# Patient Record
Sex: FEMALE | Race: BLACK OR AFRICAN AMERICAN | NOT HISPANIC OR LATINO
[De-identification: names, ages, dates, MRNs, and addresses within clinical notes are randomized per-mention and may not be internally consistent; named-entity substitution may affect disease eponyms.]

---

## 2018-03-23 ENCOUNTER — RESULT REVIEW (OUTPATIENT)
Age: 73
End: 2018-03-23

## 2018-07-23 PROBLEM — Z00.00 ENCOUNTER FOR PREVENTIVE HEALTH EXAMINATION: Status: ACTIVE | Noted: 2018-07-23

## 2018-08-23 ENCOUNTER — APPOINTMENT (OUTPATIENT)
Dept: UROLOGY | Facility: CLINIC | Age: 73
End: 2018-08-23
Payer: MEDICARE

## 2018-08-23 VITALS
DIASTOLIC BLOOD PRESSURE: 66 MMHG | BODY MASS INDEX: 40.44 KG/M2 | WEIGHT: 206 LBS | HEART RATE: 65 BPM | SYSTOLIC BLOOD PRESSURE: 117 MMHG | HEIGHT: 60 IN

## 2018-08-23 DIAGNOSIS — D57.3 SICKLE-CELL TRAIT: ICD-10-CM

## 2018-08-23 DIAGNOSIS — Z82.49 FAMILY HISTORY OF ISCHEMIC HEART DISEASE AND OTHER DISEASES OF THE CIRCULATORY SYSTEM: ICD-10-CM

## 2018-08-23 DIAGNOSIS — I25.10 ATHEROSCLEROTIC HEART DISEASE OF NATIVE CORONARY ARTERY W/OUT ANGINA PECTORIS: ICD-10-CM

## 2018-08-23 DIAGNOSIS — R60.9 EDEMA, UNSPECIFIED: ICD-10-CM

## 2018-08-23 DIAGNOSIS — E66.01 MORBID (SEVERE) OBESITY DUE TO EXCESS CALORIES: ICD-10-CM

## 2018-08-23 DIAGNOSIS — Z83.2 FAMILY HISTORY OF DISEASES OF THE BLOOD AND BLOOD-FORMING ORGANS AND CERTAIN DISORDERS INVOLVING THE IMMUNE MECHANISM: ICD-10-CM

## 2018-08-23 DIAGNOSIS — Z78.9 OTHER SPECIFIED HEALTH STATUS: ICD-10-CM

## 2018-08-23 DIAGNOSIS — Z83.3 FAMILY HISTORY OF DIABETES MELLITUS: ICD-10-CM

## 2018-08-23 DIAGNOSIS — I10 ESSENTIAL (PRIMARY) HYPERTENSION: ICD-10-CM

## 2018-08-23 DIAGNOSIS — E11.9 TYPE 2 DIABETES MELLITUS W/OUT COMPLICATIONS: ICD-10-CM

## 2018-08-23 PROCEDURE — 99203 OFFICE O/P NEW LOW 30 MIN: CPT

## 2018-08-23 RX ORDER — ISOSORBIDE DINITRATE 30 MG/1
30 TABLET ORAL
Refills: 0 | Status: ACTIVE | COMMUNITY

## 2018-08-23 RX ORDER — VALSARTAN AND HYDROCHLOROTHIAZIDE 320; 12.5 MG/1; MG/1
320-12.5 TABLET, FILM COATED ORAL
Refills: 0 | Status: ACTIVE | COMMUNITY

## 2018-08-23 RX ORDER — AMLODIPINE BESYLATE 10 MG/1
10 TABLET ORAL
Refills: 0 | Status: ACTIVE | COMMUNITY

## 2018-08-23 RX ORDER — SITAGLIPTIN 100 MG/1
100 TABLET, FILM COATED ORAL
Refills: 0 | Status: ACTIVE | COMMUNITY

## 2018-08-23 RX ORDER — ATORVASTATIN CALCIUM 20 MG/1
20 TABLET, FILM COATED ORAL
Refills: 0 | Status: ACTIVE | COMMUNITY

## 2018-08-23 RX ORDER — ASPIRIN 81 MG
81 TABLET, DELAYED RELEASE (ENTERIC COATED) ORAL
Refills: 0 | Status: ACTIVE | COMMUNITY

## 2018-08-23 RX ORDER — GLIPIZIDE 10 MG/1
10 TABLET ORAL
Refills: 0 | Status: ACTIVE | COMMUNITY

## 2018-08-23 RX ORDER — METOPROLOL TARTRATE 50 MG/1
50 TABLET, FILM COATED ORAL
Refills: 0 | Status: ACTIVE | COMMUNITY

## 2018-08-23 RX ORDER — KETOROLAC TROMETHAMINE 4 MG/ML
0.4 SOLUTION/ DROPS OPHTHALMIC
Refills: 0 | Status: ACTIVE | COMMUNITY

## 2018-08-23 NOTE — REVIEW OF SYSTEMS
[Eyesight Problems] : eyesight problems [Lower Ext Edema] : lower extremity edema [see HPI] : see HPI [Arthralgias] : arthralgias [Negative] : Heme/Lymph

## 2019-08-05 ENCOUNTER — APPOINTMENT (OUTPATIENT)
Dept: UROLOGY | Facility: CLINIC | Age: 74
End: 2019-08-05
Payer: MEDICARE

## 2019-08-05 ENCOUNTER — OUTPATIENT (OUTPATIENT)
Dept: OUTPATIENT SERVICES | Facility: HOSPITAL | Age: 74
LOS: 1 days | Discharge: HOME | End: 2019-08-05

## 2019-08-05 VITALS
SYSTOLIC BLOOD PRESSURE: 116 MMHG | DIASTOLIC BLOOD PRESSURE: 66 MMHG | HEART RATE: 65 BPM | HEIGHT: 60 IN | WEIGHT: 206 LBS | BODY MASS INDEX: 40.44 KG/M2

## 2019-08-05 PROCEDURE — 99213 OFFICE O/P EST LOW 20 MIN: CPT

## 2019-08-05 NOTE — LETTER BODY
[Dear  ___] : Dear  [unfilled], [Courtesy Letter:] : I had the pleasure of seeing your patient, [unfilled], in my office today. [Please see my note below.] : Please see my note below. [Sincerely,] : Sincerely, [FreeTextEntry2] : Marli Emmanuel MD\par [address]\[place] [address]\par

## 2019-08-05 NOTE — ASSESSMENT
[FreeTextEntry1] : Her physical exam other than her weight showed significant peripheral edema and it is only 1030 in the morning. We discussed why edema which causes what I call “cardiac nocturia” can be the cause of her nighttime voiding and there are behavior modifications that she can do to improve but probably not illuminate that. Number one shall speak to her doctor and see if her diuretics can be switched to the late afternoon or early evening to try and deplete her intravascular volume. When I worried about orthostasis as she’s going to bed and if we deplete her intravascular volume than the fluid that gets absorbed would go into a partially empty space with less of it going to her kidneys.\par \par A low-tech way is just to raise her feet in the late evening early night. This could be while she is reading a book or watching TV. Any fluid she mobilizes while awake is less fluid she will mobilize all asleep therefore she will have to wake up less often.\par \par The third option is to cut back on evening fluid. However if she like a glass of wine or a cup of tea at bedtime and doesn’t mind waking up at night it doesn’t bother me.\par \par With respect to her renal cyst, the renal ultrasound report was not explicit as it describes the mid pole cyst to be possibly new but reportedly she is comparing it to a previous study. If it was seen it is not new. If it was not seen on the previous study then it is not “may be new” it is new. Additionally the mid pole cyst which it says was 21 mm on the previous study and is now 12 x 10 by 10 is difficult to understand as the cysto get smaller. This may all just be technical or technician variability. Regardless benign cyst especially with all her other comorbidities are of no major clinical significance. One of them looks to me like it is partially parapelvic though is difficult to tell from the disc that I got. If it is that needs to be watched as that can sometimes cause obstruction. The most I would do with the cyst is just repeat the ultrasound in six months. We would see her then but if there is no major change that I think she can just follow up with her PCP.\par \par With respect to her infection as women get older and the vaginal epithelium atrophy secondary to the lack of estrogen the pH becomes less acidic. This gives bacteria the opportunity to live within the Vagina. When that happens especially if her sugar is under poor control, as we can see from her hemoglobin A1C being almost 8, that adds to the bacterial colonization of the Vagina. From the Vagina to the urethra is usually less than a centimeter. A low-tech low-cost way is vinegar douching. She can buy an old-fashioned hot water bottle with the douching adapter. One part vinegar to 10 parts warm tap water and irrigate out the Vagina anywhere between three times per week to nightly. If that works all well and good and it may help the irritated feeling she sometimes gets in the vaginal area. If she keeps getting infections will have to look into it further. I would recommend you try this for a few weeks and then get a urinalysis and culture by her PCP and if there’s a problem call me back.\par

## 2019-08-05 NOTE — HISTORY OF PRESENT ILLNESS
[FreeTextEntry1] : Bren is a 74-year-old -American female seen in August 2018. We recommended serial ultrasounds for her renal cyst, douching for the occasional UTI and lower urinary tract symptoms, paper modification cutting back on PO fluid to help decrease nocturia. She comes back now about six months since I had wanted, essentially because urologically she’s doing well. She did have one recent episode of severe right-sided pain which they initially thought was a stone but then later they decided it was a phlebolith and that her symptoms were cardiac related. [Nocturia] : nocturia [None] : None

## 2019-08-05 NOTE — LETTER HEADER
[FreeTextEntry3] : Angi Harper M.D.\par Director of Urology\par [facility]\par [address] [place] [address], [address]\[place] [address]

## 2019-08-05 NOTE — ASSESSMENT
[FreeTextEntry1] : If she cuts back on the evening fluid she wakes up one towards early morning and that’s livable. She’s doing the douching and is not having the symptoms. She has not had a follow-up sono but is scheduled.\par Physical exam really shows no major change. She has some mild pedal edema and she’s concerned re-her potential cardiac issue that was to the pain that put her in the emergency room. From urologic aspect we are not changing anything. She will check her urine periodically and that is a problem come in sooner otherwise we’ll see her after the renal sono in December\par

## 2019-08-05 NOTE — PHYSICAL EXAM
[General Appearance - Well Nourished] : well nourished [General Appearance - Well Developed] : well developed [Normal Appearance] : normal appearance [Well Groomed] : well groomed [General Appearance - In No Acute Distress] : no acute distress [FreeTextEntry1] : obese but has lost weight [Oriented To Time, Place, And Person] : oriented to person, place, and time [Affect] : the affect was normal [Mood] : the mood was normal [Not Anxious] : not anxious [Normal Station and Gait] : the gait and station were normal for the patient's age

## 2019-08-05 NOTE — LETTER HEADER
[FreeTextEntry3] : Angi Harper M.D.\par Director of Urology\par [facility]\par [address] [place] [address], [address]\[place] [address]

## 2019-08-05 NOTE — PHYSICAL EXAM
[General Appearance - Well Developed] : well developed [General Appearance - Well Nourished] : well nourished [Normal Appearance] : normal appearance [Well Groomed] : well groomed [General Appearance - In No Acute Distress] : no acute distress [Heart Rate And Rhythm] : Heart rate and rhythm were normal [Respiration, Rhythm And Depth] : normal respiratory rhythm and effort [Exaggerated Use Of Accessory Muscles For Inspiration] : no accessory muscle use [Auscultation Breath Sounds / Voice Sounds] : lungs were clear to auscultation bilaterally [Abdomen Soft] : soft [Abdomen Tenderness] : non-tender [Costovertebral Angle Tenderness] : no ~M costovertebral angle tenderness [Normal Station and Gait] : the gait and station were normal for the patient's age [] : no rash [No Focal Deficits] : no focal deficits [Oriented To Time, Place, And Person] : oriented to person, place, and time [Affect] : the affect was normal [Mood] : the mood was normal [Not Anxious] : not anxious [FreeTextEntry1] : DTR= nml

## 2019-08-05 NOTE — LETTER BODY
[Dear  ___] : Dear  [unfilled], [Consult Letter:] : I had the pleasure of evaluating your patient, [unfilled]. [Please see my note below.] : Please see my note below. [Consult Closing:] : Thank you very much for allowing me to participate in the care of this patient.  If you have any questions, please do not hesitate to contact me. [FreeTextEntry2] : Marli Emmanuel MD\par [address]\[place] [address]\par

## 2019-08-05 NOTE — HISTORY OF PRESENT ILLNESS
[Nocturia] : nocturia [None] : None [FreeTextEntry1] : Ms. Mann is a very pleasant 73 or female of -American descent who on an ultrasound done as part of maintenance of her health on July 13, 2018 showed to right renal cysts. One was recorded as smaller one which recorded as new.\par \par A separate issue is nocturia times two or three. 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 When the sugar is under better control she does better but lately she’s been on steroids because of peripheral distant disease and her sugars have not been a stables in the past.\par \par Finally she developed a UTI in mid July, something she has not had in quite a while and she wonders what we can do about that.\par

## 2019-08-06 DIAGNOSIS — R35.1 NOCTURIA: ICD-10-CM

## 2019-08-06 LAB
APPEARANCE: CLEAR
BILIRUBIN URINE: NEGATIVE
BLOOD URINE: NEGATIVE
COLOR: YELLOW
GLUCOSE QUALITATIVE U: NEGATIVE
KETONES URINE: NEGATIVE
LEUKOCYTE ESTERASE URINE: NEGATIVE
NITRITE URINE: NEGATIVE
PH URINE: 5.5
PROTEIN URINE: NEGATIVE
SPECIFIC GRAVITY URINE: 1.02
UROBILINOGEN URINE: NORMAL

## 2019-08-07 LAB — URINE CYTOLOGY: NORMAL

## 2019-08-08 LAB — BACTERIA UR CULT: ABNORMAL

## 2019-08-09 ENCOUNTER — OUTPATIENT (OUTPATIENT)
Dept: OUTPATIENT SERVICES | Facility: HOSPITAL | Age: 74
LOS: 1 days | Discharge: HOME | End: 2019-08-09

## 2019-08-10 DIAGNOSIS — R35.1 NOCTURIA: ICD-10-CM

## 2019-08-12 ENCOUNTER — RESULT REVIEW (OUTPATIENT)
Age: 74
End: 2019-08-12

## 2019-08-12 LAB
APPEARANCE: CLEAR
BACTERIA UR CULT: ABNORMAL
BILIRUBIN URINE: NEGATIVE
BLOOD URINE: NEGATIVE
COLOR: YELLOW
GLUCOSE QUALITATIVE U: NEGATIVE
KETONES URINE: NEGATIVE
LEUKOCYTE ESTERASE URINE: NEGATIVE
NITRITE URINE: NEGATIVE
PH URINE: 6
PROTEIN URINE: NORMAL
SPECIFIC GRAVITY URINE: 1.02
UROBILINOGEN URINE: NORMAL

## 2019-08-16 ENCOUNTER — OUTPATIENT (OUTPATIENT)
Dept: OUTPATIENT SERVICES | Facility: HOSPITAL | Age: 74
LOS: 1 days | Discharge: HOME | End: 2019-08-16

## 2019-08-16 ENCOUNTER — APPOINTMENT (OUTPATIENT)
Dept: UROLOGY | Facility: CLINIC | Age: 74
End: 2019-08-16
Payer: MEDICARE

## 2019-08-16 VITALS
DIASTOLIC BLOOD PRESSURE: 70 MMHG | HEART RATE: 71 BPM | BODY MASS INDEX: 40.44 KG/M2 | WEIGHT: 206 LBS | SYSTOLIC BLOOD PRESSURE: 132 MMHG | HEIGHT: 60 IN

## 2019-08-16 DIAGNOSIS — N39.0 URINARY TRACT INFECTION, SITE NOT SPECIFIED: ICD-10-CM

## 2019-08-16 DIAGNOSIS — A49.9 URINARY TRACT INFECTION, SITE NOT SPECIFIED: ICD-10-CM

## 2019-08-16 PROCEDURE — 51701 INSERT BLADDER CATHETER: CPT

## 2019-08-17 DIAGNOSIS — N39.0 URINARY TRACT INFECTION, SITE NOT SPECIFIED: ICD-10-CM

## 2019-08-19 LAB — BACTERIA UR CULT: NORMAL

## 2019-12-01 ENCOUNTER — FORM ENCOUNTER (OUTPATIENT)
Age: 74
End: 2019-12-01

## 2019-12-02 ENCOUNTER — OUTPATIENT (OUTPATIENT)
Dept: OUTPATIENT SERVICES | Facility: HOSPITAL | Age: 74
LOS: 1 days | Discharge: HOME | End: 2019-12-02
Payer: MEDICARE

## 2019-12-02 DIAGNOSIS — R35.1 NOCTURIA: ICD-10-CM

## 2019-12-02 PROCEDURE — 76775 US EXAM ABDO BACK WALL LIM: CPT | Mod: 26

## 2019-12-12 ENCOUNTER — APPOINTMENT (OUTPATIENT)
Dept: UROLOGY | Facility: CLINIC | Age: 74
End: 2019-12-12
Payer: MEDICARE

## 2019-12-12 VITALS
HEART RATE: 63 BPM | SYSTOLIC BLOOD PRESSURE: 142 MMHG | BODY MASS INDEX: 40.44 KG/M2 | WEIGHT: 206 LBS | HEIGHT: 60 IN | DIASTOLIC BLOOD PRESSURE: 75 MMHG

## 2019-12-12 PROCEDURE — 99213 OFFICE O/P EST LOW 20 MIN: CPT

## 2019-12-12 RX ORDER — METHYLPREDNISOLONE 4 MG/1
4 TABLET ORAL
Refills: 0 | Status: COMPLETED | COMMUNITY
End: 2019-12-12

## 2019-12-12 NOTE — HISTORY OF PRESENT ILLNESS
[FreeTextEntry1] : Bren is a very pleasant 74-year-old -American female last seen on August 2019. At that point catheterize urine said showed that her positive cultures were more non-clean catch urines. Voiding evaluation showed that her nocturia was more related to volume of intake and we recommended behavior modification, serial urine’s and if you became symptomatic to come in and give us a call. She comes in today having not done the urinary testing but she’s been asymptomatic, she had the ultrasound done December 2 is here for review [None] : no symptoms

## 2019-12-12 NOTE — LETTER BODY
[Dear  ___] : Dear  [unfilled], [Courtesy Letter:] : I had the pleasure of seeing your patient, [unfilled], in my office today. [Please see my note below.] : Please see my note below. [Sincerely,] : Sincerely, [FreeTextEntry2] : Marli Emmanuel MD\par [address]\[place] [address]\par

## 2019-12-12 NOTE — ASSESSMENT
[FreeTextEntry1] : She is asymptomatic. Even if she had bacteria in her urine we would not treat her. I think because there is a calcification in the system or we should check up periodically but I think we can wait a year.\par \naty Correia I have agreed she gets symptomatic she will get a urine sample done and contact us if necessary will do a straight cath. Will get an ultrasound in a year God willing she will come back after that. If everything remain stable and I think at that point she can follow up with her PCP coming here p.r.n.\par

## 2019-12-12 NOTE — PHYSICAL EXAM
[General Appearance - Well Developed] : well developed [General Appearance - Well Nourished] : well nourished [Normal Appearance] : normal appearance [Well Groomed] : well groomed [General Appearance - In No Acute Distress] : no acute distress [] : no respiratory distress [FreeTextEntry1] : obese but has lost weight [Respiration, Rhythm And Depth] : normal respiratory rhythm and effort [Exaggerated Use Of Accessory Muscles For Inspiration] : no accessory muscle use [Oriented To Time, Place, And Person] : oriented to person, place, and time [Mood] : the mood was normal [Affect] : the affect was normal [Not Anxious] : not anxious [Normal Station and Gait] : the gait and station were normal for the patient's age

## 2019-12-12 NOTE — LETTER HEADER
[FreeTextEntry3] : Angi Harper M.D.\par Director of Urology\par [facility]\par [address] [place] [address], [address]\[place] [address]

## 2020-12-04 ENCOUNTER — RESULT REVIEW (OUTPATIENT)
Age: 75
End: 2020-12-04

## 2020-12-04 ENCOUNTER — OUTPATIENT (OUTPATIENT)
Dept: OUTPATIENT SERVICES | Facility: HOSPITAL | Age: 75
LOS: 1 days | Discharge: HOME | End: 2020-12-04
Payer: MEDICARE

## 2020-12-04 DIAGNOSIS — N28.1 CYST OF KIDNEY, ACQUIRED: ICD-10-CM

## 2020-12-04 PROCEDURE — 76775 US EXAM ABDO BACK WALL LIM: CPT | Mod: 26

## 2020-12-14 ENCOUNTER — APPOINTMENT (OUTPATIENT)
Dept: UROLOGY | Facility: CLINIC | Age: 75
End: 2020-12-14
Payer: MEDICARE

## 2020-12-14 VITALS
HEIGHT: 59 IN | HEART RATE: 61 BPM | WEIGHT: 203 LBS | DIASTOLIC BLOOD PRESSURE: 63 MMHG | TEMPERATURE: 97 F | SYSTOLIC BLOOD PRESSURE: 128 MMHG | BODY MASS INDEX: 40.92 KG/M2

## 2020-12-14 DIAGNOSIS — R35.1 NOCTURIA: ICD-10-CM

## 2020-12-14 DIAGNOSIS — N28.1 CYST OF KIDNEY, ACQUIRED: ICD-10-CM

## 2020-12-14 PROCEDURE — 99072 ADDL SUPL MATRL&STAF TM PHE: CPT

## 2020-12-14 PROCEDURE — 99213 OFFICE O/P EST LOW 20 MIN: CPT

## 2020-12-14 NOTE — ASSESSMENT
[FreeTextEntry1] : Her renal cyst is stable from the past year. There is no need for intervention at this time. Our recommendation is that she obtains yearly sonograms by her PCP and if there is any change in her calcified cyst she should be sent to followup with us for further evaluation.\par \par As I explained to her I am not asking her PCP to manage the cyst if it changes but if there is no change for her just to come in and me to say you're doing well is not a good use of her time. If her PCP is not willing to do that there we will of course be happy to see her but I'd like to minimize the effort she expends in seeing physicians.\par \par Her nocturia appears to be volume related and doesn't bother her and if it doesn't bother her it doesn't bother me.

## 2020-12-14 NOTE — LETTER BODY
[Dear  ___] : Dear  [unfilled], [Courtesy Letter:] : I had the pleasure of seeing your patient, [unfilled], in my office today. [Please see my note below.] : Please see my note below. [Sincerely,] : Sincerely, [FreeTextEntry2] : Marli Emmanuel MD\par [address]\[place] [address]\par

## 2020-12-14 NOTE — PHYSICAL EXAM
[General Appearance - Well Developed] : well developed [General Appearance - Well Nourished] : well nourished [Normal Appearance] : normal appearance [Well Groomed] : well groomed [General Appearance - In No Acute Distress] : no acute distress [] : no respiratory distress [Respiration, Rhythm And Depth] : normal respiratory rhythm and effort [Exaggerated Use Of Accessory Muscles For Inspiration] : no accessory muscle use [Oriented To Time, Place, And Person] : oriented to person, place, and time [Affect] : the affect was normal [Mood] : the mood was normal [Not Anxious] : not anxious [FreeTextEntry1] : Ambulating with walker

## 2020-12-14 NOTE — HISTORY OF PRESENT ILLNESS
[Nocturia] : nocturia [FreeTextEntry1] : Bren is a 75-year-old female who we have been following for a "calcified" renal cyst. Has a history of nocturia, which has improved since she changed the timing of her blood pressure medications. We found her nocturia to be mostly volume based recommended behavioral changes. Currently she states she is voiding well denies any significantly bothersome voiding symptoms. She presents to review her renal sonogram.

## 2020-12-14 NOTE — LETTER HEADER
[FreeTextEntry3] : Angi Harper M.D.\par Director of Urology\par [facility]\par [address] [place] [address], [address]\[place] [address]

## 2020-12-21 PROBLEM — N39.0 BACTERIAL UTI: Status: RESOLVED | Noted: 2018-08-23 | Resolved: 2020-12-21

## 2022-08-16 ENCOUNTER — APPOINTMENT (OUTPATIENT)
Dept: ORTHOPEDIC SURGERY | Facility: CLINIC | Age: 77
End: 2022-08-16

## 2022-08-16 PROCEDURE — 20611 DRAIN/INJ JOINT/BURSA W/US: CPT | Mod: RT

## 2022-08-16 PROCEDURE — 73030 X-RAY EXAM OF SHOULDER: CPT | Mod: RT

## 2022-08-16 PROCEDURE — 99213 OFFICE O/P EST LOW 20 MIN: CPT | Mod: 25

## 2022-08-16 RX ORDER — METOPROLOL SUCCINATE 50 MG/1
50 TABLET, EXTENDED RELEASE ORAL
Qty: 90 | Refills: 0 | Status: ACTIVE | COMMUNITY
Start: 2022-08-11

## 2022-08-16 RX ORDER — ATORVASTATIN CALCIUM 40 MG/1
40 TABLET, FILM COATED ORAL
Qty: 90 | Refills: 0 | Status: ACTIVE | COMMUNITY
Start: 2022-05-19

## 2022-08-16 RX ORDER — NITROFURANTOIN (MONOHYDRATE/MACROCRYSTALS) 25; 75 MG/1; MG/1
100 CAPSULE ORAL
Qty: 14 | Refills: 0 | Status: ACTIVE | COMMUNITY
Start: 2022-03-28

## 2022-08-16 RX ORDER — IRBESARTAN AND HYDROCHLOROTHIAZIDE 150; 12.5 MG/1; MG/1
150-12.5 TABLET ORAL
Qty: 90 | Refills: 0 | Status: ACTIVE | COMMUNITY
Start: 2022-05-19

## 2022-08-16 RX ORDER — NABUMETONE 750 MG/1
750 TABLET, FILM COATED ORAL
Qty: 60 | Refills: 0 | Status: ACTIVE | COMMUNITY
Start: 2022-04-13

## 2022-08-16 RX ORDER — GLIPIZIDE 10 MG/1
10 TABLET, FILM COATED, EXTENDED RELEASE ORAL
Qty: 90 | Refills: 0 | Status: ACTIVE | COMMUNITY
Start: 2022-04-10

## 2022-08-16 NOTE — HISTORY OF PRESENT ILLNESS
[de-identified] : The patient is a 77-year-old female here for a re-evaluation of her left shoulder and would like her right shoulder evaluated.  Regarding her left shoulder she has osteoarthritis.  She tried nabumetone but it did not help her.  Regarding her right shoulder she has had 3 months of pain with no trauma to the area.  She is a diabetic.  Her blood glucose level this morning was 110.  She had cortisone injection for her left shoulder on 04/13/2022 which provided her with some relief.

## 2022-08-16 NOTE — DISCUSSION/SUMMARY
[de-identified] :   I reviewed the x-ray findings of the right shoulder with the patient.  She will start formal therapy for both shoulders.  Rx for meloxicam sent to the pharmacy for her.  Today we discussed a cortisone injection for the left shoulder and she agreed.  The left glenohumeral joint was injected with cortisone, procedure note generated.  She will monitor her blood glucose levels.  I will see her back in 2 months for further evaluation.\par \par Supervising physician:

## 2022-08-16 NOTE — PROCEDURE
[Large Joint Injection] : Large joint injection [Left] : of the left [Glenohumeral Joint] : glenohumeral joint [___ cc    1%] : Lidocaine ~Vcc of 1%  [___ cc    4mg] : Dexamethasone (Decadron) ~Vcc of 4 mg  [Glenohmeral injection] : glenohumeral injection [All ultrasound images have been permanently captured and stored accordingly in our picture archiving and communication system] : All ultrasound images have been permanently captured and stored accordingly in our picture archiving and communication system [Visualization of the needle and placement of injection was performed without complication] : visualization of the needle and placement of injection was performed without complication

## 2022-08-16 NOTE — DATA REVIEWED
[Right] : of the right [Shoulder] : shoulder [FreeTextEntry1] : Three views right shoulder were obtained today.  There is no fracture noted.  There is spurring off the greater tuberosity.  No glenohumeral joint arthritis.  Slightly high-riding humeral head.

## 2022-08-16 NOTE — PHYSICAL EXAM
[Bilateral] : shoulder bilaterally [] : motor and sensory intact distally [FreeTextEntry9] : Active forward flexion of the right shoulder to 90°, passive forward flexion of the right shoulder to 180°.  Active range of motion with abduction of the right shoulder to 90°, active internal rotation of the right shoulder to L3.\par \par Active forward flexion of the left shoulder to 90°, passive range of motion of the left shoulder with forward flexion 110°.  Active range of motion with abduction of the left shoulder to 90°, active range of motion with internal rotation of the left shoulder to L5. [de-identified] :   She has significant pain and weakness with rotator cuff resistance testing of the right shoulder.

## 2022-08-17 ENCOUNTER — APPOINTMENT (OUTPATIENT)
Dept: ORTHOPEDIC SURGERY | Facility: CLINIC | Age: 77
End: 2022-08-17

## 2022-09-13 ENCOUNTER — RX RENEWAL (OUTPATIENT)
Age: 77
End: 2022-09-13

## 2022-10-14 ENCOUNTER — RX RENEWAL (OUTPATIENT)
Age: 77
End: 2022-10-14

## 2022-10-18 ENCOUNTER — APPOINTMENT (OUTPATIENT)
Dept: ORTHOPEDIC SURGERY | Facility: CLINIC | Age: 77
End: 2022-10-18

## 2022-10-18 DIAGNOSIS — M19.012 PRIMARY OSTEOARTHRITIS, LEFT SHOULDER: ICD-10-CM

## 2022-10-18 PROCEDURE — 20611 DRAIN/INJ JOINT/BURSA W/US: CPT | Mod: LT

## 2022-10-18 PROCEDURE — 99213 OFFICE O/P EST LOW 20 MIN: CPT | Mod: 25

## 2022-10-18 NOTE — HISTORY OF PRESENT ILLNESS
[de-identified] : The patient is a 77-year-old female here for subsequent re-evaluation of both shoulders.  Regarding her right shoulder she is feeling much better.  She is doing home therapy exercises for the shoulder and she is taking meloxicam on a as needed basis.  Regarding the left shoulder, she had a cortisone injection on her previous visit here which provided her with very good relief.  It is starting to wear off.  She is a diabetic, her blood glucose level this morning was 84.  She recently had a breast biopsy and results were negative for a malignant process.

## 2022-10-18 NOTE — DISCUSSION/SUMMARY
[de-identified] : I recommended a repeat cortisone injection for the left shoulder, the patient agreed.  The left shoulder was injected with cortisone, procedure note generated.  She will continue doing home exercises for both shoulders.  She may take meloxicam on a as needed basis.  I will see her back in 3 months for further evaluation.\par \par Supervising physician:  Dr. Nuñez

## 2022-10-18 NOTE — PROCEDURE
[Large Joint Injection] : Large joint injection [Left] : of the left [Glenohumeral Joint] : glenohumeral joint [___ cc    1%] : Lidocaine ~Vcc of 1%  [___ cc    4mg] : Dexamethasone (Decadron) ~Vcc of 4 mg  [Risks, benefits, alternatives discussed / Verbal consent obtained] : the risks benefits, and alternatives have been discussed, and verbal consent was obtained [All ultrasound images have been permanently captured and stored accordingly in our picture archiving and communication system] : All ultrasound images have been permanently captured and stored accordingly in our picture archiving and communication system

## 2022-10-18 NOTE — PHYSICAL EXAM
[Bilateral] : shoulder bilaterally [] : no tenderness to palpation [FreeTextEntry9] : Active forward flexion right shoulder 160°, passive range of motion forward flexion 180°, active range of motion abduction 110°, active range of motion internal rotation L4.\par Active range of motion for flexion left shoulder 120°, passive range of motion for flexion 130° active abduction to 90°, active internal rotation L5. [de-identified] :   Some weakness rotator cuff resistance testing right shoulder, good strength with rotator cuff resistance testing left shoulder.

## 2023-01-18 ENCOUNTER — APPOINTMENT (OUTPATIENT)
Dept: ORTHOPEDIC SURGERY | Facility: CLINIC | Age: 78
End: 2023-01-18
Payer: MEDICARE

## 2023-01-18 DIAGNOSIS — M25.511 PAIN IN RIGHT SHOULDER: ICD-10-CM

## 2023-01-18 DIAGNOSIS — M75.02 ADHESIVE CAPSULITIS OF LEFT SHOULDER: ICD-10-CM

## 2023-01-18 PROCEDURE — 20610 DRAIN/INJ JOINT/BURSA W/O US: CPT | Mod: LT

## 2023-01-18 PROCEDURE — 99213 OFFICE O/P EST LOW 20 MIN: CPT | Mod: 25

## 2023-01-18 NOTE — PROCEDURE
[Large Joint Injection] : Large joint injection [Left] : of the left [Glenohumeral Joint] : glenohumeral joint [___ cc    1%] : Lidocaine ~Vcc of 1%  [___ cc    4mg] : Dexamethasone (Decadron) ~Vcc of 4 mg  [Risks, benefits, alternatives discussed / Verbal consent obtained] : the risks benefits, and alternatives have been discussed, and verbal consent was obtained

## 2023-01-18 NOTE — HISTORY OF PRESENT ILLNESS
[de-identified] : The patient is a 77-year-old female here for subsequent re-evaluation of both shoulders. She is doing home therapy exercises for both shoulders.  The left shoulder troubles more than the right shoulder.  She is a diabetic.  Her blood glucose this morning was 108.

## 2023-01-18 NOTE — PHYSICAL EXAM
[Bilateral] : shoulder bilaterally [] : no tenderness to palpation [FreeTextEntry9] : Active forward flexion right shoulder 170°, passive range of motion forward flexion 180°, active range of motion abduction 170°, active range of motion internal rotation L4.\par Active range of motion for flexion left shoulder 130°, passive range of motion for flexion 130° active abduction to 90°, active internal rotation L5. [de-identified] :   Good strength with rotator cuff resistance testing bilaterally.

## 2023-01-18 NOTE — DISCUSSION/SUMMARY
[de-identified] :   Today I recommend a cortisone injection for the left shoulder.  The patient agreed.  The left glenohumeral joint was injected with 2 cc lidocaine, 1 cc dexamethasone.  Procedure note generated.  She will continue home therapy exercises for both shoulders.  I will see her back in 2 months for further evaluation.\par \par Supervising physician:  Dr. Pickens

## 2023-03-20 ENCOUNTER — APPOINTMENT (OUTPATIENT)
Dept: ORTHOPEDIC SURGERY | Facility: CLINIC | Age: 78
End: 2023-03-20

## 2023-09-29 ENCOUNTER — APPOINTMENT (OUTPATIENT)
Dept: NEUROLOGY | Facility: CLINIC | Age: 78
End: 2023-09-29
Payer: MEDICARE

## 2023-09-29 VITALS — DIASTOLIC BLOOD PRESSURE: 82 MMHG | SYSTOLIC BLOOD PRESSURE: 181 MMHG | HEART RATE: 67 BPM

## 2023-09-29 VITALS — HEIGHT: 60 IN | WEIGHT: 206 LBS | BODY MASS INDEX: 40.44 KG/M2

## 2023-09-29 DIAGNOSIS — M79.661 PAIN IN RIGHT LOWER LEG: ICD-10-CM

## 2023-09-29 PROCEDURE — 99204 OFFICE O/P NEW MOD 45 MIN: CPT

## 2023-10-10 ENCOUNTER — APPOINTMENT (OUTPATIENT)
Dept: NEUROLOGY | Facility: CLINIC | Age: 78
End: 2023-10-10
Payer: MEDICARE

## 2023-10-10 PROCEDURE — 95912 NRV CNDJ TEST 11-12 STUDIES: CPT

## 2023-10-10 PROCEDURE — 95886 MUSC TEST DONE W/N TEST COMP: CPT

## 2023-10-11 ENCOUNTER — RX RENEWAL (OUTPATIENT)
Age: 78
End: 2023-10-11

## 2023-10-24 ENCOUNTER — APPOINTMENT (OUTPATIENT)
Dept: MRI IMAGING | Facility: CLINIC | Age: 78
End: 2023-10-24
Payer: MEDICARE

## 2023-10-24 PROCEDURE — 72148 MRI LUMBAR SPINE W/O DYE: CPT

## 2023-10-31 ENCOUNTER — APPOINTMENT (OUTPATIENT)
Dept: NEUROLOGY | Facility: CLINIC | Age: 78
End: 2023-10-31
Payer: MEDICARE

## 2023-10-31 VITALS — BODY MASS INDEX: 40.44 KG/M2 | HEIGHT: 60 IN | WEIGHT: 206 LBS

## 2023-10-31 DIAGNOSIS — M54.31 SCIATICA, RIGHT SIDE: ICD-10-CM

## 2023-10-31 PROCEDURE — 99213 OFFICE O/P EST LOW 20 MIN: CPT

## 2023-10-31 RX ORDER — TIZANIDINE 4 MG/1
4 TABLET ORAL 3 TIMES DAILY
Qty: 30 | Refills: 0 | Status: ACTIVE | COMMUNITY
Start: 2023-10-31 | End: 1900-01-01

## 2023-10-31 RX ORDER — GABAPENTIN 300 MG/1
300 CAPSULE ORAL 3 TIMES DAILY
Qty: 90 | Refills: 2 | Status: ACTIVE | COMMUNITY
Start: 2023-10-31 | End: 1900-01-01

## 2023-11-13 ENCOUNTER — RX RENEWAL (OUTPATIENT)
Age: 78
End: 2023-11-13

## 2023-12-18 ENCOUNTER — RX RENEWAL (OUTPATIENT)
Age: 78
End: 2023-12-18

## 2024-01-16 ENCOUNTER — RX RENEWAL (OUTPATIENT)
Age: 79
End: 2024-01-16

## 2024-01-31 ENCOUNTER — APPOINTMENT (OUTPATIENT)
Dept: NEUROLOGY | Facility: CLINIC | Age: 79
End: 2024-01-31

## 2024-02-06 ENCOUNTER — APPOINTMENT (OUTPATIENT)
Dept: NEUROLOGY | Facility: CLINIC | Age: 79
End: 2024-02-06
Payer: MEDICARE

## 2024-02-06 VITALS — DIASTOLIC BLOOD PRESSURE: 77 MMHG | HEART RATE: 59 BPM | SYSTOLIC BLOOD PRESSURE: 179 MMHG

## 2024-02-06 VITALS — WEIGHT: 206 LBS | HEIGHT: 60 IN | BODY MASS INDEX: 40.44 KG/M2

## 2024-02-06 DIAGNOSIS — M54.16 RADICULOPATHY, LUMBAR REGION: ICD-10-CM

## 2024-02-06 PROCEDURE — 99213 OFFICE O/P EST LOW 20 MIN: CPT

## 2024-02-06 RX ORDER — DICLOFENAC POTASSIUM 50 MG/1
50 TABLET, COATED ORAL
Qty: 60 | Refills: 3 | Status: ACTIVE | COMMUNITY
Start: 2024-02-06 | End: 1900-01-01

## 2024-02-06 NOTE — ASSESSMENT
[FreeTextEntry1] : 78 year old female with chronic lumbar pain with radiculopathy.  I will prescribe Diclofenac 50 mg bid prn and she will continue to attend PT twice a week. She will f/u in 2-3 months for reevaluation and if there is any issue she will contact the office.  Stephanie Nicolas MS, VICKIE Rubio DO, Supervising Physician

## 2024-02-06 NOTE — HISTORY OF PRESENT ILLNESS
[FreeTextEntry1] : ORIGINAL PRESENTATION:  It's a pleasure to see Ms. NIKUNJ MANN In the office today. 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  She has a history of lumbar radiculopathy with MRI done years ago showing multilevel degenerative disc disease and had gone for pain management injection in the past.  She also is currently taking gabapentin, and says her back pain fluctuates from day-to-day.  The numbness in her toes is relatively new and her doctor wants her to go for a work-up to distinguish neuropathy secondary to diabetes or a vascular cause.  She has also been referred to a vascular surgeon.   Additionally, she reports that she has been having deep right calf pain usually at night.  She denies any swelling or redness associated with it and even though she thought about DVT, but forgot to mention it to her doctor to get an lower extremity Doppler. She denies any frequent falls, but did have an episode of fall couple weeks ago of unclear reason.  She uses a cane with seat for ambulation which works.  TODAY:  I had the pleasure of seeing Ms. Mann today in follow up.  Her previous history and physical findings have been reviewed.   She is under our care for chronic lumbar radiculopathy which she is receiving continuing active treatment for.  She continues to experience R lower extremity pain but states PT is helping.  She attends PT twice a week which is helping with pain, ROM, and overall function.  She also performs a HEP and states she feels the therapy has been helping her the most.  She stopped using the gabapentin stating it was not helping her and making her too drowsy. She was using Tramadol 50 mg very sparingly that she had left over from another doctor that was prescribed almost a year ago which is helpful but has run out.  I advised that I am not able to prescribe this but we can discuss alternative options and she is agreeable stating the pain is not constant but when it is bad can go as high as an 8/10.

## 2024-02-06 NOTE — PHYSICAL EXAM
[Person] : oriented to person [Place] : oriented to place [Time] : oriented to time [Concentration Intact] : normal concentrating ability [Visual Intact] : visual attention was ~T not ~L decreased [Naming Objects] : no difficulty naming common objects [Repeating Phrases] : no difficulty repeating a phrase [Writing A Sentence] : no difficulty writing a sentence [Fluency] : fluency intact [Comprehension] : comprehension intact [Reading] : reading intact [Past History] : adequate knowledge of personal past history [Cranial Nerves Optic (II)] : visual acuity intact bilaterally,  visual fields full to confrontation, pupils equal round and reactive to light [Cranial Nerves Oculomotor (III)] : extraocular motion intact [Cranial Nerves Facial (VII)] : face symmetrical [Cranial Nerves Trigeminal (V)] : facial sensation intact symmetrically [Cranial Nerves Vestibulocochlear (VIII)] : hearing was intact bilaterally [Cranial Nerves Glossopharyngeal (IX)] : tongue and palate midline [Cranial Nerves Accessory (XI - Cranial And Spinal)] : head turning and shoulder shrug symmetric [Cranial Nerves Hypoglossal (XII)] : there was no tongue deviation with protrusion [Motor Tone] : muscle tone was normal in all four extremities [Motor Strength] : muscle strength was normal in all four extremities [No Muscle Atrophy] : normal bulk in all four extremities [Abnormal Walk] : normal gait [Balance] : balance was intact [0] : Ankle jerk left 0 [Past-pointing] : there was no past-pointing [Tremor] : no tremor present [Plantar Reflex Right Only] : normal on the right [Plantar Reflex Left Only] : normal on the left [[email or web address]] : Decreased vibration, temperature below the knees bilaterally, decreased sensation to light touch just up to the base of the toes

## 2024-02-19 ENCOUNTER — RX RENEWAL (OUTPATIENT)
Age: 79
End: 2024-02-19

## 2024-03-18 ENCOUNTER — RX RENEWAL (OUTPATIENT)
Age: 79
End: 2024-03-18

## 2024-04-22 ENCOUNTER — RX RENEWAL (OUTPATIENT)
Age: 79
End: 2024-04-22

## 2024-05-07 ENCOUNTER — APPOINTMENT (OUTPATIENT)
Dept: NEUROLOGY | Facility: CLINIC | Age: 79
End: 2024-05-07

## 2024-05-24 ENCOUNTER — RX RENEWAL (OUTPATIENT)
Age: 79
End: 2024-05-24

## 2024-05-24 RX ORDER — MELOXICAM 15 MG/1
15 TABLET ORAL
Qty: 30 | Refills: 0 | Status: ACTIVE | COMMUNITY
Start: 2022-08-16 | End: 1900-01-01

## 2024-12-04 ENCOUNTER — NON-APPOINTMENT (OUTPATIENT)
Age: 79
End: 2024-12-04

## 2024-12-04 ENCOUNTER — APPOINTMENT (OUTPATIENT)
Facility: CLINIC | Age: 79
End: 2024-12-04
Payer: MEDICARE

## 2024-12-04 PROCEDURE — 92134 CPTRZ OPH DX IMG PST SGM RTA: CPT

## 2024-12-04 PROCEDURE — 99204 OFFICE O/P NEW MOD 45 MIN: CPT

## 2024-12-18 ENCOUNTER — RX RENEWAL (OUTPATIENT)
Age: 79
End: 2024-12-18

## 2025-01-27 ENCOUNTER — RX RENEWAL (OUTPATIENT)
Age: 80
End: 2025-01-27

## 2025-05-05 ENCOUNTER — RX RENEWAL (OUTPATIENT)
Age: 80
End: 2025-05-05

## 2025-06-03 ENCOUNTER — RX RENEWAL (OUTPATIENT)
Age: 80
End: 2025-06-03